# Patient Record
Sex: FEMALE | Race: WHITE | Employment: FULL TIME | URBAN - METROPOLITAN AREA
[De-identification: names, ages, dates, MRNs, and addresses within clinical notes are randomized per-mention and may not be internally consistent; named-entity substitution may affect disease eponyms.]

---

## 2019-01-02 ENCOUNTER — TELEPHONE (OUTPATIENT)
Dept: OBGYN | Facility: CLINIC | Age: 39
End: 2019-01-02

## 2019-01-02 NOTE — TELEPHONE ENCOUNTER
Received message from Tere that she has questions regarding the CAH clinic for salt wasting CAH.    Tried to reach Tere but received voicemail.  Left message to call back.

## 2019-01-02 NOTE — TELEPHONE ENCOUNTER
Spoke with Tere who reports she has salt wasting CAH and has had 3 crises in the past 3 months - the most recent was on [place].  She reports she has been seen/managed by [facility] - last appointment was November 2018.  She takes the following medications:  Hydrocortisone  - 10mg at a.m.  - 10mg at noon  - 5mg at 3:00pm  - 5mg at 6:00pm    Fludrocortisone  - 0.1mg at bedtime.    Metformin  - 500mg BID for diabetes    Discussed CAH clinic and what we offer (specialists, timeframe, location, etc...). She made an appointment with Dr. Altamirano's first available on Friday, April 19 at 1:00pm (declined visit with OB/GYN, internal medicine and genetic counselor). Advised will need records from [facility] from the past year sent to us. She agreed with plan.    Advised she be seen by her current endocrinologist prior to this appointment. She agreed with plan. If she chooses to stay with [facility] she will cancel above appointment.

## 2019-02-15 ENCOUNTER — HEALTH MAINTENANCE LETTER (OUTPATIENT)
Age: 39
End: 2019-02-15

## 2019-10-03 ENCOUNTER — HEALTH MAINTENANCE LETTER (OUTPATIENT)
Age: 39
End: 2019-10-03

## 2020-11-07 ENCOUNTER — HEALTH MAINTENANCE LETTER (OUTPATIENT)
Age: 40
End: 2020-11-07

## 2021-09-11 ENCOUNTER — HEALTH MAINTENANCE LETTER (OUTPATIENT)
Age: 41
End: 2021-09-11

## 2021-12-26 ENCOUNTER — HEALTH MAINTENANCE LETTER (OUTPATIENT)
Age: 41
End: 2021-12-26

## 2022-10-29 ENCOUNTER — HEALTH MAINTENANCE LETTER (OUTPATIENT)
Age: 42
End: 2022-10-29

## 2023-04-02 ENCOUNTER — HEALTH MAINTENANCE LETTER (OUTPATIENT)
Age: 43
End: 2023-04-02